# Patient Record
Sex: FEMALE | Race: WHITE | NOT HISPANIC OR LATINO | Employment: OTHER | URBAN - METROPOLITAN AREA
[De-identification: names, ages, dates, MRNs, and addresses within clinical notes are randomized per-mention and may not be internally consistent; named-entity substitution may affect disease eponyms.]

---

## 2023-01-11 RX ORDER — PANTOPRAZOLE SODIUM 40 MG/1
TABLET, DELAYED RELEASE ORAL
COMMUNITY

## 2023-01-11 RX ORDER — CHLORAL HYDRATE 500 MG
CAPSULE ORAL
COMMUNITY

## 2023-01-11 RX ORDER — ASPIRIN 81 MG/1
TABLET ORAL
COMMUNITY

## 2023-01-12 ENCOUNTER — TELEPHONE (OUTPATIENT)
Dept: ADMINISTRATIVE | Facility: OTHER | Age: 73
End: 2023-01-12

## 2023-01-12 NOTE — TELEPHONE ENCOUNTER
----- Message from [facility] sent at 1/12/2023 11:06 AM EST -----  Regarding: Care Gap Request CRC: Colonoscopy  01/12/23 11:06 AM    Hello, our patient attached above has had CRC: Colonoscopy completed/performed  Please assist in updating the patient chart by pulling the Care Everywhere (CE) document  The date of service is 05/15/2014       Thank you,  Margaux Young  [facility] PRIMARY CARE

## 2023-01-12 NOTE — TELEPHONE ENCOUNTER
Upon review of the In Basket request we were able to locate, review, and update the patient chart as requested for CRC: Colonoscopy  Any additional questions or concerns should be emailed to the Practice Liaisons via the appropriate education email address, please do not reply via In Basket      Thank you  Zhanna Blum MA

## 2023-01-13 ENCOUNTER — OFFICE VISIT (OUTPATIENT)
Dept: FAMILY MEDICINE CLINIC | Facility: CLINIC | Age: 73
End: 2023-01-13

## 2023-01-13 VITALS
BODY MASS INDEX: 31.16 KG/M2 | OXYGEN SATURATION: 97 % | HEART RATE: 58 BPM | TEMPERATURE: 98.3 F | WEIGHT: 169.31 LBS | DIASTOLIC BLOOD PRESSURE: 76 MMHG | HEIGHT: 62 IN | SYSTOLIC BLOOD PRESSURE: 142 MMHG

## 2023-01-13 DIAGNOSIS — Z53.20 COLONOSCOPY REFUSED: ICD-10-CM

## 2023-01-13 DIAGNOSIS — R53.81 PHYSICAL DECONDITIONING: ICD-10-CM

## 2023-01-13 DIAGNOSIS — E78.2 MIXED HYPERLIPIDEMIA: Primary | ICD-10-CM

## 2023-01-13 DIAGNOSIS — Z11.59 NEED FOR HEPATITIS C SCREENING TEST: ICD-10-CM

## 2023-01-13 DIAGNOSIS — E55.9 VITAMIN D DEFICIENCY: ICD-10-CM

## 2023-01-13 DIAGNOSIS — K22.2 GERD WITH STRICTURE: ICD-10-CM

## 2023-01-13 DIAGNOSIS — K21.9 GERD WITH STRICTURE: ICD-10-CM
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
## 2023-01-13 DIAGNOSIS — K57.90 DIVERTICULOSIS: ICD-10-CM

## 2023-01-13 DIAGNOSIS — K22.2 ESOPHAGEAL STRICTURE: ICD-10-CM

## 2023-01-13 DIAGNOSIS — G58.9 MONONEUROPATHY: ICD-10-CM

## 2023-01-13 DIAGNOSIS — Z28.21 REFUSED INFLUENZA VACCINE: ICD-10-CM

## 2023-01-13 DIAGNOSIS — M81.8 OSTEOPOROSIS OF DISUSE: ICD-10-CM

## 2023-01-13 DIAGNOSIS — Z00.00 ANNUAL PHYSICAL EXAM: ICD-10-CM

## 2023-01-13 DIAGNOSIS — Z28.21 REFUSED PNEUMOCOCCAL VACCINATION: ICD-10-CM

## 2023-01-13 RX ORDER — OMEGA-3S/DHA/EPA/FISH OIL/D3 300MG-1000
400 CAPSULE ORAL DAILY
COMMUNITY

## 2023-01-13 RX ORDER — GABAPENTIN 100 MG/1
CAPSULE ORAL
Qty: 90 CAPSULE | Refills: 3 | Status: SHIPPED | OUTPATIENT
Start: 2023-01-13

## 2023-01-13 NOTE — PATIENT INSTRUCTIONS
Patient is here to establish care in the 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 that sometimes is associated with osteoporosis and it has been almost 13 years since her last DEXA scan  We will do the scan and I can treat her for osteoporosis if she is diagnosed with this condition to prevent any further bone loss  She is also on vitamin D replacement  She was offered a flu shot and a pneumonia shot but opted out  She also has not gotten the COVID-vaccine and does not want to get the vaccine at this time  She has some reflux symptoms that she takes pantoprazole for on an irregular basis as needed  She is going to take this 30 to 40 minutes before she has anything to eat  She is almost 10 years past her last colonoscopy but at this time is opting not to pursue colonoscopy  You are going to do lipid profile to check her blood lipids as cardiovascular disease is the #1 [place] in this [place]  We are also going to be checking her liver function and her kidney function as part of her overall screening  He does have a history of hyperlipidemia in the past   We will see if medications are indicated for this condition  I will see her in 3 months as a follow-up and we can discuss lab results but I could also perform other medications if she is willing to take them as a result of these lab tests

## 2023-01-13 NOTE — PROGRESS NOTES
Assessment/Plan:       1  Mixed hyperlipidemia  -     Lipid panel; Future  -     Comprehensive metabolic panel; Future    2  Mononeuropathy  -     Ambulatory Referral to Physical Therapy; Future  -     gabapentin (Neurontin) 100 mg capsule; 1 capsule daily week 1  Morning and night for week 2  Week 3 morning afternoon and evening and then stay on 3 capsules/day  3  Esophageal stricture    4  Diverticulosis    5  BMI 30 0-30 9,adult    6  Annual physical exam    7  Need for hepatitis C screening test  -     Hepatitis C Antibody ([facility]); Future    8  Refused influenza vaccine    9  Refused pneumococcal vaccination    10  Physical deconditioning  -     Ambulatory Referral to Physical Therapy; Future    11  GERD with stricture    12  Fall, initial encounter    15  Osteoporosis of disuse  -     DXA bone density spine hip and pelvis; Future; Expected date: 01/13/2023    14  Vitamin D deficiency    15  Colonoscopy refused      Patient has been in the [facility] area  She is following her breast health and will be getting a repeat mammogram in March of this year  We discussed a lot of preventive health issues and she is willing to have a one-time screen for hepatitis C along with a DEXA scan for osteoporosis  She last had a DEXA scan in 2013  She is on vitamin D replacement but no actual treatment for any osteoporosis and it appears that she has a dowagers hump  Patient has a history of hyperlipidemia but is not on any medications other than fish oil  We are going to repeat the lipid profile in order to see if further medication is indicated  Patient was adamantly refusing COVID vaccination, pneumococcal vaccination, influenza vaccination, or colonoscopy  Her primary complaint today is with numbness and pain in both legs  Her daughter gave her a cream which did not really help with the numbness and tingling    Patient had previously been walking up to 3 miles per day with her daughter but because of this ongoing numbness in both legs with the right leg pain being greater than the left leg pain she has not been able to exercise  She has become deconditioned and is very frail  About a year ago, she had a fall at a department store in the parking lot  She broke her glasses and fell as a result of imbalance  She also feels weaker and with less endurance  She is willing to go to physical therapy for conditioning balance training and core strengthening along with endurance  Patient's BMI is 30 97 and we talked about making smarter food choices in order to help to lower her weight  BMI is above normal  Nutrition recommendations include reducing portion sizes, 3-5 servings of fruits/vegetables daily and consuming healthier snacks  Exercise recommendations include exercising 3-5 times per week and strength training exercises  Patient states that she is no longer having any problems with esophageal stricture  She does have GERD for which she takes pantoprazole as needed  We talked about the proper way for her to take this medication  Subjective:      Patient ID: Danay Breaux is a 67 y o  female  HPI: Patient's main complaint today is numbness in both legs with paresthesias  She states that she had EMG testing done and that she was told that they could not pinpoint any specific diagnosis  She is willing to try gabapentin in order to treat this peripheral neuropathy  She states that the numbness on the right leg is worse than on the left side  This is caused her to have some imbalance when walking and she had a fall about a year ago  She admits to deconditioning as she had previously been walking 3 miles per day but no longer is walking because of the peripheral neuropathy  She is willing to try physical therapy to help with balance strength training and endurance and conditioning    She has gained some weight as a result of her not being able to walk and getting more activity should help with her weight  She denies any pain in her chest heart palpitations dizziness lightheadedness  She admits some decreased hearing in both ears  She denies any vertigo  She denies any syncope or near syncope  She denied any changes in her bowel habits any melena hematochezia  She states that she is due to have a colonoscopy but does not want to consider this at this time  Previous evaluation did show she had some diverticulosis but this has not flared recently  The following portions of the patient's history were reviewed and updated as appropriate: allergies, current medications, past family history, past medical history, past social history, past surgical history, and problem list     Review of Systems  Patient denies any recent URI or viral syndrome  She states she has never had COVID nor has she had the flu  She is afraid that if she gets a flu shot that she would get the flu as this happened to her in the past   I did try to educate her about influenza and vaccination the patient was adamantly against getting any vaccines  She also denied having any interest in getting Pneumovax  Patient denies difficulty passing water dysuria frequency  She denies any easy bruising or bleeding  She denies actual edema in either leg  She states the numbness occurs on both sides from the knees distally but the numbness is worse on the right than the left side  She states that her daughter got her a cream to apply and this cream did not help in any way  Objective:      /76 (BP Location: Left arm, Patient Position: Sitting, Cuff Size: Large)   Pulse 58   Temp 98 3 °F (36 8 °C) (Tympanic)   Ht 5' 2" (1 575 m)   Wt 76 8 kg (169 lb 5 oz)   SpO2 97%   BMI 30 97 kg/m²          Physical Exam  Well-developed well-nourished 67y o  year old female who is cooperative with the exam   Patient is alert and oriented x3  Patient is appropriate in answering all questions  HEENT:  Normocephalic  PERRLA    EOMs intact  TMs are clear with identification of bony landmarks  No tragus or pinnae tenderness  No pre or posterior auricular adenopathy  Sinuses without tenderness  Throat without hyperemia  Neck:  Supple without adenopathy  Thyroid midline without thyromegaly or bruits  No carotid bruits  Chest symmetric and nontender  Heart regular rate and rhythm  No murmur rubs or gallops  Point of maximum impulse not displaced  Lungs are clear to auscultation  Breathing is nonlabored  Aerating bases well  Abdomen round and soft positive bowel sounds without masses tenderness or organomegaly  Extremities reveal adequate peripheral pulses without peripheral edema  Neuro exam on lower extremities: DTRs are +1 symmetrically  She was able to recognize light touch via monofilament strength testing on both legs and feet  She was able to recognize light touch  She was able to recognize vibration and proprioception  There are some thickening of her nails consistent with a possible onychomycosis  Peripheral pulses were strong and intact bilaterally

## 2023-01-19 ENCOUNTER — EVALUATION (OUTPATIENT)
Dept: PHYSICAL THERAPY | Facility: CLINIC | Age: 73
End: 2023-01-19

## 2023-01-19 ENCOUNTER — HOSPITAL ENCOUNTER (OUTPATIENT)
Dept: RADIOLOGY | Facility: CLINIC | Age: 73
Discharge: HOME | End: 2023-01-19

## 2023-01-19 VITALS — HEART RATE: 62 BPM | OXYGEN SATURATION: 95 % | SYSTOLIC BLOOD PRESSURE: 130 MMHG | DIASTOLIC BLOOD PRESSURE: 82 MMHG

## 2023-01-19 DIAGNOSIS — R53.81 PHYSICAL DECONDITIONING: ICD-10-CM

## 2023-01-19 DIAGNOSIS — M81.8 OSTEOPOROSIS OF DISUSE: ICD-10-CM

## 2023-01-19 DIAGNOSIS — G58.9 MONONEUROPATHY: ICD-10-CM

## 2023-01-19 NOTE — LETTER
2023    Maribeth Stratton, [address] [address] Jovany Rodriguez    Patient: Jo-Ann Friday   YOB: 1950   Date of Visit: 2023     Encounter Diagnosis     ICD-10-CM    1  Physical deconditioning  R53 81 Ambulatory Referral to Physical Therapy      2  Mononeuropathy  G58 9 Ambulatory Referral to Physical Therapy          Dear Dr Luis E Jaffe: Thank you for your recent referral of Jo-Ann Friday  Please review the attached evaluation summary from Jojo's recent visit  Please verify that you agree with the plan of care by signing the attached order  If you have any questions or concerns, please do not hesitate to call  I sincerely appreciate the opportunity to share in the care of one of your patients and hope to have another opportunity to work with you in the near future  Sincerely,    Flor Gomez, PT      Referring Provider:      I certify that I have read the below Plan of Care and certify the need for these services furnished under this plan of treatment while under my care  Maribeth Stratton PA-C  [address] [facility] [address]  [facility] [place] [address]  [address] [place]          PT Evaluation     Today's date: 2023  Patient name: Jo-Ann Friday  : 1950  MRN: [id number]  Referring provider: Chandrakant Mcwilliams*  Dx:   Encounter Diagnosis     ICD-10-CM    1  Physical deconditioning  R53 81 Ambulatory Referral to Physical Therapy      2  Mononeuropathy  G58 9 Ambulatory Referral to Physical Therapy            Precautions:positive fall hx       Daily Treatment Diary:      Initial Evaluation Date: 23  Compliance                      Visit Number 1                    Re-Eval  IE                 [facility]   Foto Captured Y                                                Manual                                                                                        Ther-Ex                      Seated toe/heel raises/ add st  Next visit 2x15  d/c to HEP                   Seated LAQ/march 2x15 each                     St  Hip abd/ext -                     Leg press -                     Self calf/HS stretch -                                                                 NeuroRe-ed                      Tandem balance 15"x4                     FT EC balance 20"x 3                     Mini squats on Air Ex -                     SLB training - each                                                                         Ther-Act              pt  Ed  Fall red strategies/fall plan PC 5 min  Modalities                                                                                       Assessment  Assessment details: 66 yo female referred for deconditioning and mononeuropathy LEs  With positive fall hx  Pt  Impairments of  Decreased LE sensation/proprioception, decreased strength, decreased flexibility LEs impacting WB tolerances and ambulation for exercise and community outings  Pt  Notes decreasing activity levels and decreased confidence with amb on uneven surfaces  Pt   Is at increased risk for falls as  5 x sit to stand   16 25 seconds  Pt will benefit from skilled care to  Achieve goals with good prognosis to  Achieve goals  Pt   tx initiated today consisting of  Therapeutic exercises, neuromuscular reeducation and pt  Education completed  With daughter  For fall reduction strategies and  Fall plan of cell phone at all times  Pt  Was educated in AD use to assist with gait  And safety and she is receptive to  [place]  For use      Impairments: abnormal gait, impaired balance, impaired physical strength and lacks appropriate home exercise program  Other impairment: decreased sensation distal LEs impacting gait and balance  Functional limitations: 6 minute walk 380 ft , no AD;   5 x sit to stand 16 25 sBarriers to therapy: None known  Understanding of Dx/Px/POC: good   Prognosis: good  Prognosis details: Pt  Chronic sensory difficulty BLEs may impact her ability to achieve goals    Goals  STG 2-4 weeks  1  Improve 5 x sit to stand to reduce fall risk  2  Increase strength of B ankle pf/df by 1/2 MMT to improve gait and amb  Tolerances  3  No further falls  LTG  4-6 weeks  1  Increase 6 minute walk test distance   By  40-60% to indicate improved  Activity tolerance  2  Increase B ankle strength  1-1 5 MMT to allow for improved gait  stability  3  Increase B hip/knee strength  By 1 - 1 5 MMT to allow for  Improved weight bearing function  4  Increase SLB either LE to 5-10 seconds to reduce likelihood of falls    Plan  Patient would benefit from: skilled physical therapy  Planned therapy interventions: manual therapy, neuromuscular re-education, patient education, self care, strengthening, stretching, therapeutic exercise, therapeutic activities and home exercise program  Other planned therapy interventions: fall plan education, fall reduction strategies  Frequency: 2x week  Duration in weeks: 6  Plan of Care beginning date: 1/19/2023  Plan of Care expiration date: 3/2/2023  Treatment plan discussed with: patient and family        Subjective Evaluation    History of Present Illness  Date of onset: 1/19/2022  Mechanism of injury: Gradual decline in physical ability for the past year with no known reason  Positive fall hx x 2 in past year without injury occurring stepping onto curb and bending to lift object outside in yard  Not a recurrent problem   Quality of life: good    Pain  No pain reported  Progression: worsening    Social Support  Steps to enter house: yes  3  Stairs in house: no   Lives in: [facility]  Lives with: adult children    Exercise history: walked 3 miles 5 days per week until  [place] 2022    Treatments  No previous or current treatments  Patient Goals  Patient goals for therapy: improved balance  Patient goal: no further falls, ambulate for exercise, amb  longer distances for exercise and community outings        Objective     Static Posture     Comments  Pt  Stands with increase in trunk flexion and WBOS    Neurological Testing     Sensation     Lumbar   Left   Diminished: proprioception  Hyposensation: light touch    Right   Diminished: proprioception  Hyposensation: light touch    Active Range of Motion   Cervical/Thoracic Spine       Thoracic    Flexion:  WFL  Extension:  Restriction level: moderate  Left rotation:  Restriction level: minimal  Right rotation:  Restriction level: minimal    Lumbar   Flexion:  Restriction level: minimal  Extension:  Restriction level: moderate  Left lateral flexion:  Restriction level: minimal  Right lateral flexion:  Restriction level: minimal  Left rotation:  Restriction level: minimal  Right rotation:  Restriction level: minimal    Strength/Myotome Testing     Lumbar   Left   Heel walk: normal    Left Hip   Planes of Motion   Flexion: 4  Extension: 4-  Abduction: 4  Adduction: 4  External rotation: 4-  Internal rotation: 4-    Right Hip   Planes of Motion   Flexion: 4  Extension: 4-  Abduction: 4-  Adduction: 4  External rotation: 4-  Internal rotation: 4-    Left Knee   Flexion: 4  Extension: 4-    Right Knee   Flexion: 4  Extension: 4    Left Ankle/Foot   Dorsiflexion: 3  Plantar flexion: 3+    Right Ankle/Foot   Dorsiflexion: 3  Plantar flexion: 3+    Additional Strength Details  Pt  Is  Unable to heel or toe walk secondary to dec  Strength ankles    Ambulation     Observational Gait   Decreased walking speed, stride length and left step length  Left foot contact pattern: foot flat  Right foot contact pattern: foot flat  Right arm swing: decreased    Functional Assessment        Single Leg Stance   Left: 1 seconds    Comments  6 minute walk test  380ft  5 x sit to stand 16 25 seconds    General Comments:      Lumbar Comments  Flexibility of BLE HS, calf mm is moderately dec   BLEs      Flowsheet Rows    Flowsheet Row Most Recent Value   PT/OT G-Codes    FOTO information reviewed Yes            Precautions: positive fall hx

## 2023-01-19 NOTE — PROGRESS NOTES
PT Evaluation     Today's date: 2023  Patient name: Joel Bennett  : 1950  MRN: [id number]  Referring provider: Destini Dutta  Dx:   Encounter Diagnosis     ICD-10-CM    1  Physical deconditioning  R53 81 Ambulatory Referral to Physical Therapy      2  Mononeuropathy  G58 9 Ambulatory Referral to Physical Therapy            Precautions:positive fall hx  Assessment  Assessment details: 68 yo female referred for deconditioning and mononeuropathy LEs  With positive fall hx  Pt  Impairments of  Decreased LE sensation/proprioception, decreased strength, decreased flexibility LEs impacting WB tolerances and ambulation for exercise and community outings  Pt  Notes decreasing activity levels and decreased confidence with amb on uneven surfaces  Pt   Is at increased risk for falls as  5 x sit to stand   16 25 seconds  Pt will benefit from skilled care to  Achieve goals with good prognosis to  Achieve goals  Pt   tx initiated today consisting of  Therapeutic exercises, neuromuscular reeducation and pt  Education completed  With daughter  For fall reduction strategies and  Fall plan of cell phone at all times  Pt  Was educated in AD use to assist with gait  And safety and she is receptive to  [place]  For use  Impairments: abnormal gait, impaired balance, impaired physical strength and lacks appropriate home exercise program  Other impairment: decreased sensation distal LEs impacting gait and balance  Functional limitations: 6 minute walk 380 ft , no AD;   5 x sit to stand 16 25 sBarriers to therapy: None known  Understanding of Dx/Px/POC: good   Prognosis: good  Prognosis details: Pt  Chronic sensory difficulty BLEs may impact her ability to achieve goals    Goals  STG 2-4 weeks  1  Improve 5 x sit to stand to reduce fall risk  2  Increase strength of B ankle pf/df by 1/2 MMT to improve gait and amb  Tolerances  3  No further falls  LTG  4-6 weeks  1   Increase 6 minute walk test distance   By  40-60% to indicate improved  Activity tolerance  2  Increase B ankle strength  1-1 5 MMT to allow for improved gait  stability  3  Increase B hip/knee strength  By 1 - 1 5 MMT to allow for  Improved weight bearing function  4  Increase SLB either LE to 5-10 seconds to reduce likelihood of falls    Plan  Patient would benefit from: skilled physical therapy  Planned therapy interventions: manual therapy, neuromuscular re-education, patient education, self care, strengthening, stretching, therapeutic exercise, therapeutic activities and home exercise program  Other planned therapy interventions: fall plan education, fall reduction strategies  Frequency: 2x week  Duration in weeks: 6  Plan of Care beginning date: 1/19/2023  Plan of Care expiration date: 3/2/2023  Treatment plan discussed with: patient and family        Subjective Evaluation    History of Present Illness  Date of onset: 1/19/2022  Mechanism of injury: Gradual decline in physical ability for the past year with no known reason  Positive fall hx x 2 in past year without injury occurring stepping onto curb and bending to lift object outside in yard  Not a recurrent problem   Quality of life: good    Pain  No pain reported  Progression: worsening    Social Support  Steps to enter house: yes  3  Stairs in house: no   Lives in: [facility]  Lives with: adult children    Exercise history: walked 3 miles 5 days per week until  [place] 2022    Treatments  No previous or current treatments  Patient Goals  Patient goals for therapy: improved balance  Patient goal: no further falls, ambulate for exercise, amb  longer distances for exercise and community outings        Objective     Static Posture     Comments  Pt   Stands with increase in trunk flexion and WBOS    Neurological Testing     Sensation     Lumbar   Left   Diminished: proprioception  Hyposensation: light touch    Right   Diminished: proprioception  Hyposensation: light touch    Active Range of Motion   Cervical/Thoracic Spine       Thoracic    Flexion:  WFL  Extension:  Restriction level: moderate  Left rotation:  Restriction level: minimal  Right rotation:  Restriction level: minimal    Lumbar   Flexion:  Restriction level: minimal  Extension:  Restriction level: moderate  Left lateral flexion:  Restriction level: minimal  Right lateral flexion:  Restriction level: minimal  Left rotation:  Restriction level: minimal  Right rotation:  Restriction level: minimal    Strength/Myotome Testing     Lumbar   Left   Heel walk: normal    Left Hip   Planes of Motion   Flexion: 4  Extension: 4-  Abduction: 4  Adduction: 4  External rotation: 4-  Internal rotation: 4-    Right Hip   Planes of Motion   Flexion: 4  Extension: 4-  Abduction: 4-  Adduction: 4  External rotation: 4-  Internal rotation: 4-    Left Knee   Flexion: 4  Extension: 4-    Right Knee   Flexion: 4  Extension: 4    Left Ankle/Foot   Dorsiflexion: 3  Plantar flexion: 3+    Right Ankle/Foot   Dorsiflexion: 3  Plantar flexion: 3+    Additional Strength Details  Pt  Is  Unable to heel or toe walk secondary to dec  Strength ankles    Ambulation     Observational Gait   Decreased walking speed, stride length and left step length  Left foot contact pattern: foot flat  Right foot contact pattern: foot flat  Right arm swing: decreased    Functional Assessment        Single Leg Stance   Left: 1 seconds    Comments  6 minute walk test  380ft  5 x sit to stand 16 25 seconds    General Comments:      Lumbar Comments  Flexibility of BLE HS, calf mm is moderately dec   BLEs      Flowsheet Rows    Flowsheet Row Most Recent Value   PT/OT G-Codes    FOTO information reviewed Yes             Daily Treatment Diary:      Initial Evaluation Date: 01/19/23  Compliance                      Visit Number 1                    Re-Eval  IE                 [facility]   Foto Captured Y                           1/19                     Manual Ther-Ex                      Seated toe/heel raises/ add st  Next visit 2x15  d/c to HEP                   Seated LAQ/march 2x15 each                     St  Hip abd/ext -                     Leg press -                     Self calf/HS stretch -                                                                 NeuroRe-ed                      Tandem balance 15"x4                     FT EC balance 20"x 3                     Mini squats on Air Ex -                     SLB training - each                                                                         Ther-Act              pt  Ed  Fall red strategies/fall plan PC 5 min                                                   Modalities

## 2023-01-23 ENCOUNTER — APPOINTMENT (OUTPATIENT)
Dept: PHYSICAL THERAPY | Facility: CLINIC | Age: 73
End: 2023-01-23

## 2023-01-26 ENCOUNTER — HOSPITAL ENCOUNTER (OUTPATIENT)
Dept: RADIOLOGY | Facility: CLINIC | Age: 73
End: 2023-01-26

## 2023-01-26 ENCOUNTER — OFFICE VISIT (OUTPATIENT)
Dept: PHYSICAL THERAPY | Facility: CLINIC | Age: 73
End: 2023-01-26

## 2023-01-26 VITALS — BODY MASS INDEX: 30.84 KG/M2 | WEIGHT: 167.6 LBS | HEIGHT: 62 IN

## 2023-01-26 DIAGNOSIS — G58.9 MONONEUROPATHY: ICD-10-CM

## 2023-01-26 DIAGNOSIS — R53.81 PHYSICAL DECONDITIONING: Primary | ICD-10-CM

## 2023-01-26 NOTE — PROGRESS NOTES
Daily Note     Today's date: 2023  Patient name: Jo-Ann Friday  : 1950  MRN: [id number]  Referring provider: Chandrakant Mcwilliams*  Dx:   Encounter Diagnosis     ICD-10-CM    1  Physical deconditioning  R53 81       2  Mononeuropathy  G58 9                      Subjective:" I'm doing ok today but stopped the one medication"      Objective: See treatment diary below      Assessment: Tolerated treatment well  She was able to progress with neuromuscular reeducation tasks today  Pt  Did require CS to CG A with EC neuromusc  Derrek tasks  Pt  did require  A for initial set up/adjust for nustep  Pt  Completed 5 minutes with UE use , L 1 with good tolerance  Pt fall risk reduction reviewed  Patient would benefit from continued PT to improve  LE strength, conditioning and balance  Pt  Advised to inform physician of stopping med reported at intake today and she verbalized agreement  Plan: Continue per plan of care  Daily Treatment Diary:      Initial Evaluation Date: 23  Compliance                    Visit Number 1 2                   Re-Marisol BROOKS -                MC   Foto Captured Y -                                             Manual                                                                                        Ther-Ex                      Seated toe/heel raises 2x15 20x2                   Seated LA/march 2x15 each  2x20  each  PREs                 St  Hip abd/ext -                     Leg press -                     Self calf/HS stretch -                     nustep   5 min  warm up                                       NeuroRe-ed                      Tandem balance/ tandem walk 15"x4  15"x4/4 x at mirror                   FT EC floor/FA EO Air Ex  20"x 3  30" x 3 each  CG A                   Mini squats on Air Ex -  2 x 5                   SLB training - each  3 x 10" each LE, UE A req  Ther-Act              pt  Ed  Fall red strategies/fall plan PC 5 min    review PC                   Sit to stands   3 x 5                         Modalities

## 2023-01-30 ENCOUNTER — OFFICE VISIT (OUTPATIENT)
Dept: PHYSICAL THERAPY | Facility: CLINIC | Age: 73
End: 2023-01-30

## 2023-01-30 DIAGNOSIS — R53.81 PHYSICAL DECONDITIONING: Primary | ICD-10-CM

## 2023-01-30 DIAGNOSIS — G58.9 MONONEUROPATHY: ICD-10-CM

## 2023-01-30 NOTE — PROGRESS NOTES
Daily Note     Today's date: 2023  Patient name: Joanne Oliver  : 1950  MRN: [id number]  Referring provider: Laura Folrez*  Dx:   Encounter Diagnosis     ICD-10-CM    1  Physical deconditioning  R53 81       2  Mononeuropathy  G58 9                      Subjective:  I am doing well today and denies any dizziness or LOB  Objective: See treatment diary below      Assessment: Tolerated treatment well but did require cues for upright posture x 3 during exercises following initial instructions  Patient amb 2 laps following [employer], Neuro nadia with cues x 2 for upright posture and modeling demonstrated by PT  Pt  demonstrated no LOB  With flat footed gait  Patient exhibited good technique with therapeutic exercises requiring cue and CS to CG A with neuro reeduction tasks  Pt  Showed improved control with EC tasks and air ex squats  Pt  Did require  CG A x 1 during L LE fwd lunge secondary to fatigue on R hip musculature  Pt  Demonstrated improved tolerance for sit to stands but cont to require use of UE's for task completion  Plan: Continue per plan of care  Daily Treatment Diary:      Initial Evaluation Date: 23  Compliance                  Visit Number 1 2  3                 Re-Eval  IE -  -              MC   Foto Captured Y -  -                                         Manual                                                                                        Ther-Ex                      Seated toe/heel raises 2x15 20x2  20x2                 Seated /march 2x15 each  2x20  each  30 x each                 St  Hip abd/ext -   x 10, 2#                 Leg press -                     Self calf/HS stretch -                     nustep   5 min  warm up 5 min   Level 4                                       NeuroRe-ed                      Tandem balance/ tandem walk 15"x4  15"x4/4 x at mirror  15"x4 4 at mirror                 FT EC floor/FA [employer] Ex 20"x 3  30" x 3 each  CG A  30"x3 each CS/3 x 30"                 Mini squats on Air Ex -  2 x 5  2x10                 SLB training - each  3 x 10" each LE, UE A req    -                 FWD lunges   1 x 10 CS                                                 Ther-Act              pt  Ed  Fall red strategies/fall plan PC 5 min    review PC  review PC                 Sit to stands    5x10          amb 2 laps   Posture cues -PC           Modalities

## 2023-02-02 ENCOUNTER — OFFICE VISIT (OUTPATIENT)
Dept: PHYSICAL THERAPY | Facility: CLINIC | Age: 73
End: 2023-02-02

## 2023-02-02 DIAGNOSIS — G58.9 MONONEUROPATHY: ICD-10-CM

## 2023-02-02 DIAGNOSIS — R53.81 PHYSICAL DECONDITIONING: Primary | ICD-10-CM

## 2023-02-02 NOTE — PROGRESS NOTES
Daily Note     Today's date: 2023  Patient name: Ham Mcmillan  : 1950  MRN: [id number]  Referring provider: Wilmer Simmons*  Dx:   Encounter Diagnosis     ICD-10-CM    1  Physical deconditioning  R53 81       2  Mononeuropathy  G58 9                      Subjective: My legs feel weak getting up from chairs  I want to be stronger  Objective: See treatment diary below      Assessment: Tolerated treatment well  We did progress to standing and seated PREs as well as table core strength ex  Patient demonstrated fatigue post treatment  Pt  Completed 10 x sit to stand and did require UE A for completion of task  Pt  Progressed to  Alternating LE step tapping for  Dynamic balance/gait activity standing  With cues for posture and need to use LUE for A for balance with task, close S of PT completed  Pt  Did  Report she had LE  fatigue post tx this date  Plan: Continue per plan of care  Daily Treatment Diary:      Initial Evaluation Date: 23  Compliance   2/2               Visit Number 1 2  3  4               Re-Eval  IE -  -  -            MC   Foto Captured Y -  -  -                        2/2               Manual                                                                                        Ther-Ex                      Seated toe/heel raises 2x15 20x2  20x2  20x2-d/c to HEP next visit               Seated LAQ/march 2x15 each  2x20  each  30 x each  2# 2 x 10ea               St  Hip abd/ext -   x 10, 2#  2# [id number]/ext/abd               Leg press -                     Bridges/clam -      2x10 Bridges/1x10 clam               nustep   5 min  warm up 5 min  Level 4  10 min   Warm up               Step ups       4" 2 x 10 each LE/Close S               NeuroRe-ed                      Tandem balance/ tandem walk 15"x4  15"x4/4 x at mirror  15"x4 4 at mirror  -               FT EC floor/FA EO Air Ex  20"x 3  30" x 3 each  CG A  30"x3 each CS/3 x 30"  -               Mini squats on Air Ex -  2 x 5  2x10  -               SLB training - each  3 x 10" each LE, UE A req    -  -               FWD lunges   1 x 10 CS -         Step taps    4" 2 x 10  UE A                                   Ther-Act              pt  Ed  Fall red strategies/fall plan PC 5 min    review PC  review PC   review PC               Sit to stands    5x10 2x10         amb 2 laps   Posture cues -PC Posture cues          Modalities

## 2023-02-06 ENCOUNTER — OFFICE VISIT (OUTPATIENT)
Dept: PHYSICAL THERAPY | Facility: CLINIC | Age: 73
End: 2023-02-06

## 2023-02-06 DIAGNOSIS — G58.9 MONONEUROPATHY: ICD-10-CM

## 2023-02-06 DIAGNOSIS — R53.81 PHYSICAL DECONDITIONING: Primary | ICD-10-CM

## 2023-02-06 NOTE — PROGRESS NOTES
Daily Note     Today's date: 2023  Patient name: Marlen Medellin  : 1950  MRN: [id number]  Referring provider: Gareth Bautista*  Dx:   Encounter Diagnosis     ICD-10-CM    1  Physical deconditioning  R53 81       2  Mononeuropathy  G58 9                      Subjective: "I'm getting stronger, my family is noticing Im walking better and getting up from sitting much better  I feel stronger too"      Objective: See treatment diary below      Assessment: Tolerated treatment well  She did tolerate increase in LE PREs to 3# this date  She did require cues to widen stance and for upright posture for stance occasionally  Patient did progress to complete step tapping on 6" height with close S and no HR assist  25% of the time today  Pt  Progressed to 6" step ups  Patient demonstrated fatigue post treatment      Plan: Continue per plan of care  Daily Treatment Diary:      Initial Evaluation Date: 23  Compliance              Visit Number 1 2  3  4  5             Re-Eval  IE -  -  -  -          MC   Foto Captured Y -  -  -  -                                 Manual                                                                                        Ther-Ex                      Seated toe/heel raises 2x15 20x2  20x2  20x2-d/c to HEP next visit  standing toe and heel raises 2 x 10             Seated LAQ/march 2x15 each  2x20  each  30 x each  2# 2 x 10ea  3#2x10             St  Hip abd/ext -   x 10, 2#  2# [id number]/ext/abd  3#2x10 flex/ext/abd             Leg press -                     Bridges/clam -      2x10 Bridges/1x10 clam  - Pt  declined today             nustep   5 min  warm up 5 min  Level 4  10 min   Warm up  10 min warm up level 4             Step ups       4" 2 x 10 each LE/Close S  6"2 x 10 close S, one HR A             NeuroRe-ed                      Tandem balance/ tandem walk 15"x4  15"x4/4 x at mirror  15"x4 4 at mirror  -  - FT EC floor/FA EO Air Ex  20"x 3  30" x 3 each  CG A  30"x3 each CS/3 x 30"  -  -             Mini squats on Air Ex -  2 x 5  2x10  -               SLB training - each  3 x 10" each LE, UE A req  -  -  3 x 15" each , 2 HR A             FWD lunges   1 x 10 CS - -        Step taps    4" 2 x 10  UE A 6" 2 x 15 UE A 1 HR to 0 HR, CS        Scap   Retr/GH ext tbands standing     -                     Ther-Act              pt  Ed  Fall red strategies/fall plan PC 5 min    review PC  review PC   review PC  review PC             Sit to stands    5x10 2x10 2x10 last 4 without UE A        amb 2 laps   Posture cues -PC Posture cues Posture cues         Modalities

## 2023-02-09 ENCOUNTER — OFFICE VISIT (OUTPATIENT)
Dept: PHYSICAL THERAPY | Facility: CLINIC | Age: 73
End: 2023-02-09

## 2023-02-09 DIAGNOSIS — G58.9 MONONEUROPATHY: ICD-10-CM

## 2023-02-09 DIAGNOSIS — R53.81 PHYSICAL DECONDITIONING: Primary | ICD-10-CM

## 2023-02-09 NOTE — PROGRESS NOTES
Daily Note     Today's date: 2023  Patient name: Liz Loya  : 1950  MRN: [id number]  Referring provider: Neeta Dao*  Dx:   Encounter Diagnosis     ICD-10-CM    1  Physical deconditioning  R53 81       2  Mononeuropathy  G58 9                      Subjective: *" I can get up the steps better and am getting in and out of the tub better  I am getting stronger"      Objective: See treatment diary below      Assessment: Tolerated treatment well  She was able to complete   5 sit to stands with out UE A with close S today  Pt  Progressed to sit to stand  While on AE today  Trial of leg press attempted but did not feel comfortable with posturing therefore did not complete  Patient completed mini squats on AE for improved strength and  NM control in LEs, requiring CG A for task  Patient demonstrated fatigue post treatment allevaited with brief rest seated and had no pain following care  Patient  progressing toward outlined goals of care at this time with good response to PT  Patient ambulating well this date with improved posture and heel toe gait pattern in treatment without cueing  Plan: Continue per plan of care  Daily Treatment Diary:      Initial Evaluation Date: 23  Compliance            Visit Number 1 2  3  4  5  6           Re-Eval  IE -  -  -  -  -        MC   Foto Captured Y -  -  -  -  Y                             Manual                                                                                        Ther-Ex                      Seated toe/heel raises 2x15 20x2  20x2  20x2-d/c to HEP next visit  standing toe and heel raises 2 x 10  st  Heel toe raises 2x10           Seated /march 2x15 each  2x20  each  30 x each  2# 2 x 10ea  3#2x10  3# 2x 10           St  Hip abd/ext -   x 10, 2#  2# [id number]/ext/abd  3#2x10 flex/ext/abd  3# 2x10           Leg press -          not kentrell              Bridges/clam - 2x10 Bridges/1x10 clam  - Pt  declined today  seated clam green tband, hip add ball squeeze  2 x 10           nustep   5 min  warm up 5 min  Level 4  10 min  Warm up  10 min warm up level 4  10 min  Level 4           Step ups       4" 2 x 10 each LE/Close S  6"2 x 10 close S, one HR A  6" 2 x 10, HRA, close S           NeuroRe-ed                      Tandem balance/ tandem walk 15"x4  15"x4/4 x at mirror  15"x4 4 at mirror  -  -  15x4 at mirror/tandem walk CG A 2 x mirror           FT EC floor/FA EO Air Ex  20"x 3  30" x 3 each  CG A  30"x3 each CS/3 x 30"  -  -  FT EC AE 3 x 30"           Mini squats on Air Ex -  2 x 5  2x10  -    2x10           SLB training - each  3 x 10" each LE, UE A req  -  -  3 x 15" each , 2 HR A  3 x 15" each  2 HR           FWD lunges   1 x 10 CS - - -       Step taps    4" 2 x 10  UE A 6" 2 x 15 UE A 1 HR to 0 HR, CS 6" 10 x2 CGA       Scap   Retr/GH ext tbands standing     - 2 x 15, 3"                    Ther-Act              pt  Ed  Fall red strategies/fall plan PC 5 min    review PC  review PC   review PC  review PC  -           Sit to stands    5x10 2x10 2x10 last 4 without UE A 2x15, last  5 no UE A, 10 x on AE CG A       amb 2 laps   Posture cues -PC Posture cues Posture cues -        Modalities

## 2023-02-13 ENCOUNTER — OFFICE VISIT (OUTPATIENT)
Dept: PHYSICAL THERAPY | Facility: CLINIC | Age: 73
End: 2023-02-13

## 2023-02-13 DIAGNOSIS — G58.9 MONONEUROPATHY: ICD-10-CM

## 2023-02-13 DIAGNOSIS — R53.81 PHYSICAL DECONDITIONING: Primary | ICD-10-CM

## 2023-02-13 NOTE — PROGRESS NOTES
Daily Note     Today's date: 2023  Patient name: Whitney Beltre  : 1950  MRN: [id number]  Referring provider: Maury Paz*  Dx:   Encounter Diagnosis     ICD-10-CM    1  Physical deconditioning  R53 81       2  Mononeuropathy  G58 9                      Subjective: " I'm feeling good, moving better on the stairs and getting up from chairs"      Objective: See treatment diary below      Assessment: Tolerated treatment well  She did progress to 2 x 10 step ups with CS  and one HR to no HR A each LE   Pt  progressed to increased reps of sit to stand without UEA today  Patient demonstrated fatigue post treatment, exhibited good technique with therapeutic exercises and would benefit from continued PT      Plan: Continue per plan of care  Daily Treatment Diary:      Initial Evaluation Date: 23  Compliance          Visit Number 1 2  3  4  5  6  7         Re-Eval  IE -  -  -  -  -  -      MC   Foto Captured Y -  -  -  -  Y  -                  2         Manual                                                                                        Ther-Ex                      Seated toe/heel raises 2x15 20x2  20x2  20x2-d/c to HEP next visit  standing toe and heel raises 2 x 10  st  Heel toe raises 2x10  st  Heel toe raises 2 x 10         Seated LAQ/march 2x15 each  2x20  each  30 x each  2# 2 x 10ea  3#2x10  3# 2x 10  3# 2 x10 LAQ         St  Hip abd/ext -   x 10, 2#  2# [id number]/ext/abd  3#2x10 flex/ext/abd  3# 2x10  3# 2x10         Leg press -          not kentrell    -         Bridges/clam -      2x10 Bridges/1x10 clam  - Pt  declined today  seated clam green tband, hip add ball squeeze  2 x 10  -         nustep   5 min  warm up 5 min  Level 4  10 min  Warm up  10 min warm up level 4  10 min  Level 4  10 min   Level 4         Step ups       4" 2 x 10 each LE/Close S  6"2 x 10 close S, one HR A  6" 2 x 10, HRA, close S  6" 2 x 10 HRA , close S         NeuroRe-ed                      Tandem balance/ tandem walk 15"x4  15"x4/4 x at mirror  15"x4 4 at mirror  -  -  15x4 at mirror/tandem walk CG A 2 x mirror  15mirror /tandem walk CG Ax4         FT EC floor/FA EO Air Ex  20"x 3  30" x 3 each  CG A  30"x3 each CS/3 x 30"  -  -  FT EC AE 3 x 30"  -         Mini squats on Air Ex -  2 x 5  2x10  -    2x10  3 x 10         SLB training - each  3 x 10" each LE, UE A req  -  -  3 x 15" each , 2 HR A  3 x 15" each  2 HR  4x15" ea  HR A         FWD lunges   1 x 10 CS - - -       Step taps    4" 2 x 10  UE A 6" 2 x 15 UE A 1 HR to 0 HR, CS 6" 10 x2 CGA 6" 10 x 2 CG A      Scap   Retr/GH ext tbands standing     - 2 x 15, 3"  2 x 15, 3"                   Ther-Act              pt  Ed  Fall red strategies/fall plan PC 5 min    review PC  review PC   review PC  review PC  -   review         Sit to stands    5x10 2x10 2x10 last 4 without UE A 2x15, last  5 no UE A, 10 x on AE CG A  2/10 w/o UE A,  1 x 10 w/ UE A, cues for  posture      amb 2 laps   Posture cues -PC Posture cues Posture cues - 3 laps posture cues , heel toe gait       Modalities

## 2023-02-16 ENCOUNTER — OFFICE VISIT (OUTPATIENT)
Dept: PHYSICAL THERAPY | Facility: CLINIC | Age: 73
End: 2023-02-16

## 2023-02-16 DIAGNOSIS — R53.81 PHYSICAL DECONDITIONING: Primary | ICD-10-CM

## 2023-02-16 NOTE — PROGRESS NOTES
Daily Note/DISCHARGE     Today's date: 2023  Patient name: Joel Bennett  : 1950  MRN: [id number]  Referring provider: Destini Dutta  Dx:   Encounter Diagnosis     ICD-10-CM    1  Physical deconditioning  R53 81                      Subjective:" I went for a  walk yesterday about,30 minutes, for the first time since some time last summer and it felt great" " I want to discharge today because I am doing so well"      Objective: See treatment diary below      Assessment:  Assessment details: 68 yo female referred for deconditioning and mononeuropathy LEs  With positive fall hx  No falls or  LOB reported since beginning PT  Pt completed     PT visits and today states she achieved her goals and wishes to D/C PT for this reason  Pt reports good  family support  Patient achieve or progress to partially achieve goals of care in PT with positive response and good compliance with treatment  Limitations of neuropathy impact balance and goal achievements  Pt   Is at decreased risk for falls as  5 x sit to stand  Improved to 14 25 seconds  Patient SLB does remain decreased at  3-4 seconds  Patient was educated on fall risk with recommendation of [facility] & [address] use for long distances or uneven surfaces to assist with gait stability with verbalization of understanding/agreement   Functional limitations: 6 minute walk 380 ft , no AD;   5 x sit to stand 14 25s   Barriers to therapy: None known Current  Objective Measures:   Lumbar   Flexion:  Restriction level: minimal  Extension:  Restriction level: moderate  Left lateral flexion:  Restriction level: minimal  Right lateral flexion:  Restriction level: minimal  Left rotation:  Restriction level: minimal  Right rotation:  Restriction level: minimal    Strength/Myotome Testing     Left Hip   Planes of Motion   Flexion: 4+  Extension: 4+  Abduction: 4+  Adduction: 4+  External rotation: 4+  Internal rotation: 4+    Right Hip   Planes of Motion Flexion: 4+  Extension: 4+  Abduction: 4  Adduction: 4+  External rotation: 4  Internal rotation: 4    Left Knee   Flexion: 5  Extension: 5    Right Knee   Flexion: 4+  Extension: 4+    Left Ankle/Foot   Dorsiflexion: 4+  Plantar flexion:4    Right Ankle/Foot   Dorsiflexion: 4+  Plantar flexion: 4    Goals  STG 2-4 weeks  1  Improve 5 x sit to stand to reduce fall risk-  achieved   14:25 seconds    2  Increase strength of B ankle pf/df by 1/2 MMT to improve gait and amb  Tolerances - achieved    3  No further falls -    achieved   LTG  4-6 weeks  1  Increase 6 minute walk test distance   By  40-60% to indicate improved  Activity tolerance- not tested at discharge visit secondary to unanticipated d/c  2  Increase B ankle strength  1-1 5 MMT to allow for improved gait  Stability- achieved   3  Increase B hip/knee strength  By 1 - 1 5 MMT to allow for  Improved weight bearing function- a chieved  4    Increase SLB either LE to 5-10 seconds to reduce likelihood of falls - partially achieved  3-4 seconds each LE    Plan:  Discharge  To [facility] as per patient this date with her stating she achieved her goals     Daily Treatment Diary:      Initial Evaluation Date: 01/19/23  Compliance 1/19 1/26 1/30  2/2 2/6 2/9 2/13 2/16       Visit Number 1 2  3  4  5  6  7  8       Re-Eval  IE -  -  -  -  -  -  -    [facility] Captured Y -  -  -  -  Y  -  -              1/19 1/26 1/30  2/2  2/6 2/9 2/13 2/16       Manual                                                                                        Ther-Ex                      Seated toe/heel raises 2x15 20x2  20x2  20x2-d/c to [facility] next visit  standing toe and heel raises 2 x 10  st  Heel toe raises 2x10  st  Heel toe raises 2 x 10  st heel/ toe 2 x10       Seated LAQ/march 2x15 each  2x20  each  30 x each  2# 2 x 10ea  3#2x10  3# 2x 10  3# 2 x10 LAQ  3# 2 x 10 LAQ       St  Hip abd/ext -   x 10, 2#  2# [id number]/ext/abd  3#2x10 flex/ext/abd  3# 2x10  3# 2x10  3# 2x10 Leg press -          not kentrell    -  -       Bridges/clam -      2x10 Bridges/1x10 clam  - Pt  declined today  seated clam green tband, hip add ball squeeze  2 x 10  -  -       nustep   5 min  warm up 5 min  Level 4  10 min  Warm up  10 min warm up level 4  10 min  Level 4  10 min  Level 4  10 m L 4       Step ups       4" 2 x 10 each LE/Close S  6"2 x 10 close S, one HR A  6" 2 x 10, HRA, close S  6" 2 x 10 HRA , close S  6"  2 x 15 HRA       NeuroRe-ed                      Tandem balance/ tandem walk 15"x4  15"x4/4 x at mirror  15"x4 4 at mirror  -  -  15x4 at mirror/tandem walk CG A 2 x mirror  15mirror /tandem walk CG Ax4  AE step overs  2 x 15 each LE       FT EC floor/FA EO Air Ex  20"x 3  30" x 3 each  CG A  30"x3 each CS/3 x 30"  -  -  FT EC AE 3 x 30"  -  -       Mini squats on Air Ex -  2 x 5  2x10  -    2x10  3 x 10  2 x 15       SLB training - each  3 x 10" each LE, UE A req  -  -  3 x 15" each , 2 HR A  3 x 15" each  2 HR  4x15" ea  HR A  4x15" ea, HR A       FWD lunges   1 x 10 CS - - -  -     Step taps    4" 2 x 10  UE A 6" 2 x 15 UE A 1 HR to 0 HR, CS 6" 10 x2 CGA 6" 10 x 2 CG A 6" 2 x 30"close S     Scap   Retr/GH ext tbands standing     - 2 x 15, 3"  2 x 15, 3" 2x15 orange tband                  Ther-Act              pt  Ed  Fall red strategies/fall plan PC 5 min    review PC  review PC   review PC  review PC  -   review  review+recommend [facility] outdoors [place] , long distance       Sit to stands    5x10 2x10 2x10 last 4 without UE A 2x15, last  5 no UE A, 10 x on AE CG A  2/10 w/o UE A,  1 x 10 w/ UE A, cues for  posture 2x10 w/out UEs     amb 2 laps   Posture cues -PC Posture cues Posture cues - 3 laps posture cues , heel toe gait 3 laps  Heel toe gait      Modalities

## 2023-03-02 ENCOUNTER — APPOINTMENT (OUTPATIENT)
Dept: PHYSICAL THERAPY | Facility: CLINIC | Age: 73
End: 2023-03-02

## 2023-03-06 ENCOUNTER — APPOINTMENT (OUTPATIENT)
Dept: PHYSICAL THERAPY | Facility: CLINIC | Age: 73
End: 2023-03-06

## 2023-03-09 ENCOUNTER — APPOINTMENT (OUTPATIENT)
Dept: PHYSICAL THERAPY | Facility: CLINIC | Age: 73
End: 2023-03-09

## 2023-03-13 ENCOUNTER — APPOINTMENT (OUTPATIENT)
Dept: PHYSICAL THERAPY | Facility: CLINIC | Age: 73
End: 2023-03-13

## 2023-03-16 ENCOUNTER — APPOINTMENT (OUTPATIENT)
Dept: PHYSICAL THERAPY | Facility: CLINIC | Age: 73
End: 2023-03-16

## 2024-12-24 ENCOUNTER — OFFICE VISIT (OUTPATIENT)
Dept: URGENT CARE | Facility: CLINIC | Age: 74
End: 2024-12-24
Payer: COMMERCIAL

## 2024-12-24 VITALS
WEIGHT: 160 LBS | HEART RATE: 93 BPM | HEIGHT: 62 IN | DIASTOLIC BLOOD PRESSURE: 82 MMHG | TEMPERATURE: 97.1 F | RESPIRATION RATE: 18 BRPM | SYSTOLIC BLOOD PRESSURE: 128 MMHG | OXYGEN SATURATION: 97 % | BODY MASS INDEX: 29.44 KG/M2

## 2024-12-24 DIAGNOSIS — J06.9 VIRAL URI WITH COUGH: Primary | ICD-10-CM

## 2024-12-24 PROCEDURE — S9083 URGENT CARE CENTER GLOBAL: HCPCS

## 2024-12-24 PROCEDURE — 99213 OFFICE O/P EST LOW 20 MIN: CPT

## 2024-12-24 NOTE — PATIENT INSTRUCTIONS
Take Robitussin or Delsym as needed for cough  Plenty of fluids  Tylenol or ibuprofen as needed   Follow up with PCP in 3-5 days.  Proceed to  ER if symptoms worsen.    If tests are performed, our office will contact you with results only if changes need to made to the care plan discussed with you at the visit. You can review your full results on [facility] My[facility].

## 2024-12-24 NOTE — PROGRESS NOTES
[facility]        NAME: Jojo Beck is a 74 y.o. female  : 1950    MRN: [id number]  DATE: 2024  TIME: 9:30 AM    Assessment and Plan   Viral URI with cough [J06.9]  1. Viral URI with cough              Patient Instructions     Take Robitussin or Delsym as needed for cough  Plenty of fluids  Tylenol or ibuprofen as needed   Follow up with PCP in 3-5 days.  Proceed to  ER if symptoms worsen.    If tests are performed, our office will contact you with results only if changes need to made to the care plan discussed with you at the visit. [facility]    Chief Complaint     Chief Complaint   Patient presents with    Cough     Started today  No OTC medication         History of Present Illness       Cough  This is a new problem. The current episode started today. The cough is Non-productive. Pertinent negatives include no chest pain, chills, ear pain, fever, postnasal drip, rhinorrhea, sore throat, shortness of breath or wheezing. She has tried nothing for the symptoms. There is no history of asthma.       Review of Systems   Review of Systems   Constitutional:  Negative for chills, diaphoresis, fatigue and fever.   HENT:  Negative for congestion, ear pain, postnasal drip, rhinorrhea, sinus pressure, sore throat and trouble swallowing.    Respiratory:  Positive for cough (dry). Negative for chest tightness, shortness of breath and wheezing.    Cardiovascular:  Negative for chest pain and palpitations.   Skin:  Negative for color change.   Neurological:  Negative for dizziness and light-headedness.   Psychiatric/Behavioral:  Negative for sleep disturbance.          Current Medications       Current Outpatient Medications:     aspirin (ECOTRIN LOW STRENGTH) 81 mg EC tablet, Take by mouth, Disp: , Rfl:     Calcium Carb-Cholecalciferol (CALCIUM 500/D PO), Take 2 tablets by mouth daily, Disp: , Rfl:     cholecalciferol (VITAMIN D3) 400 units tablet,  "Take 400 Units by mouth daily, Disp: , Rfl:     cyanocobalamin (VITAMIN B-12) 1000 MCG tablet, Take 1,000 mcg by mouth, Disp: , Rfl:     Docusate Sodium (DSS) 100 MG CAPS, Take 100 mg by mouth, Disp: , Rfl:     gabapentin (Neurontin) 100 mg capsule, 1 capsule daily week 1.  Morning and night for week 2.  Week 3 morning afternoon and evening and then stay on 3 capsules/day., Disp: 90 capsule, Rfl: 3    Garlic 1000 MG CAPS, Take 1 capsule by mouth daily, Disp: , Rfl:     Omega-3 Fatty Acids (fish oil) 1,000 mg, Take by mouth, Disp: , Rfl:     pantoprazole (PROTONIX) 40 mg tablet, , Disp: , Rfl:     GARLIC PO, , Disp: , Rfl:     Current Allergies     Allergies as of 12/24/2024 - Reviewed 12/24/2024   Allergen Reaction Noted    Alendronate Nausea Only 08/05/2008    Sulfa antibiotics Nausea Only 10/14/2004            The following portions of the patient's history were reviewed and updated as appropriate: allergies, current medications, past family history, past medical history, past social history, past surgical history and problem list.     History reviewed. No pertinent past medical history.    Past Surgical History:   Procedure Laterality Date    COLON SURGERY         No family history on file.      Medications have been verified.        Objective   /82   Pulse 93   Temp (!) 97.1 °F (36.2 °C)   Resp 18   Ht 5' 2\" (1.575 m)   Wt 72.6 kg (160 lb)   SpO2 97%   BMI 29.26 kg/m²        Physical Exam     Physical Exam  Constitutional:       General: She is not in acute distress.     Appearance: Normal appearance. She is not ill-appearing.   HENT:      Head: Normocephalic.      Right Ear: Tympanic membrane and external ear normal.      Left Ear: Tympanic membrane and external ear normal.      Nose: No congestion.      Mouth/Throat:      Mouth: Mucous membranes are moist.      Pharynx: Oropharynx is clear.   Cardiovascular:      Rate and Rhythm: Normal rate and regular rhythm.      Pulses: Normal pulses.      " Heart sounds: Normal heart sounds.   Pulmonary:      Effort: Pulmonary effort is normal. No respiratory distress.      Breath sounds: Normal breath sounds. No stridor. No wheezing, rhonchi or rales.   Lymphadenopathy:      Cervical: No cervical adenopathy.   Skin:     General: Skin is warm and dry.   Neurological:      General: No focal deficit present.      Mental Status: She is alert and oriented to person, place, and time. Mental status is at baseline.   Psychiatric:         Mood and Affect: Mood normal.         Behavior: Behavior normal.         Thought Content: Thought content normal.         Judgment: Judgment normal.